# Patient Record
Sex: MALE | URBAN - NONMETROPOLITAN AREA
[De-identification: names, ages, dates, MRNs, and addresses within clinical notes are randomized per-mention and may not be internally consistent; named-entity substitution may affect disease eponyms.]

---

## 2021-01-01 ENCOUNTER — OFFICE VISIT (OUTPATIENT)
Dept: URBAN - NONMETROPOLITAN AREA CLINIC 5 | Facility: CLINIC | Age: 36
End: 2021-01-01
Payer: COMMERCIAL

## 2021-01-01 ENCOUNTER — PROCEDURE (OUTPATIENT)
Dept: URBAN - METROPOLITAN AREA CLINIC 41 | Facility: CLINIC | Age: 36
End: 2021-01-01
Payer: COMMERCIAL

## 2021-01-01 DIAGNOSIS — H43.13 VITREOUS HEMORRHAGE, BILATERAL: ICD-10-CM

## 2021-01-01 DIAGNOSIS — H25.13 AGE-RELATED NUCLEAR CATARACT, BILATERAL: ICD-10-CM

## 2021-01-01 DIAGNOSIS — E11.3593 TYPE 2 DIAB WITH PROLIF DIAB RTNOP WITHOUT MACULAR EDEMA, BI: Primary | ICD-10-CM

## 2021-01-01 PROCEDURE — 92134 CPTRZ OPH DX IMG PST SGM RTA: CPT | Performed by: OPHTHALMOLOGY

## 2021-01-01 PROCEDURE — 67228 TREATMENT X10SV RETINOPATHY: CPT | Performed by: OPHTHALMOLOGY

## 2021-01-01 PROCEDURE — 99204 OFFICE O/P NEW MOD 45 MIN: CPT | Performed by: OPHTHALMOLOGY

## 2021-01-01 ASSESSMENT — INTRAOCULAR PRESSURE
OS: 6
OD: 15
OS: 12
OD: 13
OD: 8
OS: 13
OD: 17
OS: 14
OS: 13
OD: 13

## 2021-03-18 NOTE — IMPRESSION/PLAN
Impression: Type 2 diab with prolif diab rtnop without macular edema, bi: H70.7464. OCT OU = NVD OU no SRF/IRF OU Plan: The patient is a type 2 diabetic. There is active neovascularization associated with proliferative diabetic retinopathy (PDR), and the patient would benefit from panretinal photocoagulation (PRP)  vs anti-Vascular Endothelial Growth Factor (anti-VEGF) treatment. At this time, there is no significant diabetic macular edema (DME). I emphasized the importance of blood sugar and blood pressure control. Rec series of Avastin OU with PRP OU as well. Discussed R,B,A of Avastin vs Lucentis vs Eylea injection. Discussed no FDA approval with Avastin and compounding risk. Discussed signs and symptoms of retinal detachment. Patient understands and wishes to proceed with Avastin injection today. Timeout was performed before procedure. AVASTIN INJECTION COMPLETED TODAY as per protocol without complications. 1-2 weeks PRP OD
1-2 weeks PRP  OS 1m OCT/Avastin OU #2/3

## 2021-03-18 NOTE — IMPRESSION/PLAN
Impression: Age-related nuclear cataract, bilateral: H25.13.  Plan: NVS, but may grow with PPV/EL in the future

## 2021-05-06 NOTE — IMPRESSION/PLAN
Impression: Type 2 diab with prolif diab rtnop without macular edema, bi: O98.5302.
s/p PRP OU
OCT OU = NVD OU no SRF/IRF OU Plan: Improving NVD s/p PRP OU and Avastin OU. No TRD. VH slowly resolving. Rec series of Avastin OU Discussed R,B,A of Avastin vs Lucentis vs Eylea injection. Discussed no FDA approval with Avastin and compounding risk. Discussed signs and symptoms of retinal detachment. Patient understands and wishes to proceed with Avastin injection today. Timeout was performed before procedure. AVASTIN INJECTION COMPLETED TODAY as per protocol without complications. 

1m OCT/Avastin OU #2/3